# Patient Record
Sex: FEMALE | Race: WHITE | URBAN - METROPOLITAN AREA
[De-identification: names, ages, dates, MRNs, and addresses within clinical notes are randomized per-mention and may not be internally consistent; named-entity substitution may affect disease eponyms.]

---

## 2022-08-30 ENCOUNTER — WEB ENCOUNTER (OUTPATIENT)
Dept: URBAN - METROPOLITAN AREA CLINIC 40 | Facility: CLINIC | Age: 67
End: 2022-08-30

## 2022-08-30 ENCOUNTER — OFFICE VISIT (OUTPATIENT)
Dept: URBAN - METROPOLITAN AREA CLINIC 40 | Facility: CLINIC | Age: 67
End: 2022-08-30
Payer: MEDICARE

## 2022-08-30 VITALS
HEIGHT: 61 IN | WEIGHT: 176 LBS | SYSTOLIC BLOOD PRESSURE: 113 MMHG | DIASTOLIC BLOOD PRESSURE: 80 MMHG | OXYGEN SATURATION: 97 % | HEART RATE: 62 BPM | BODY MASS INDEX: 33.23 KG/M2 | TEMPERATURE: 97.2 F

## 2022-08-30 DIAGNOSIS — R10.32 LEFT LOWER QUADRANT ABDOMINAL PAIN: ICD-10-CM

## 2022-08-30 DIAGNOSIS — K21.9 CHRONIC GERD: ICD-10-CM

## 2022-08-30 DIAGNOSIS — R14.0 BLOATING: ICD-10-CM

## 2022-08-30 DIAGNOSIS — K59.00 CONSTIPATION: ICD-10-CM

## 2022-08-30 DIAGNOSIS — R11.0 NAUSEA: ICD-10-CM

## 2022-08-30 DIAGNOSIS — K62.5 RECTAL BLEEDING: ICD-10-CM

## 2022-08-30 DIAGNOSIS — R94.5 ABNORMAL LIVER FUNCTION TESTS: ICD-10-CM

## 2022-08-30 PROCEDURE — 99214 OFFICE O/P EST MOD 30 MIN: CPT | Performed by: INTERNAL MEDICINE

## 2022-08-30 RX ORDER — CARVEDILOL 3.12 MG/1
1 TABLET WITH FOOD TABLET, FILM COATED ORAL TWICE A DAY
Status: ACTIVE | COMMUNITY

## 2022-08-30 NOTE — HPI-TODAY'S VISIT:
Patient was last seen in our office on 5/15/2019.  She underwent colonoscopy on 1/29/2018 which showed sigmoid diverticulosis.  She has a history of gastroesophageal reflux.  She underwent a normal right upper quadrant ultrasound December 2017. Patient returns for evaluation on 8/30/2022.  Overall she been doing well, with essentially normal bowel movements until several weeks ago.  She then presented with severe constipation, which did resolve after taking mineral oil.  She then added nightly magnesium, which while initially seem to help, then caused an episode of severe diarrhea accompanied by incontinence.  She denies any overt bleeding, she has been complaining of some bloating after meals.  She has been using digestive enzymes, which she states has been beneficial.  She has had mild reflux, but has not been on PPI therapy, and has had no swallowing issues.  She denies any significant abdominal pain.

## 2022-10-13 ENCOUNTER — OFFICE VISIT (OUTPATIENT)
Dept: URBAN - METROPOLITAN AREA CLINIC 40 | Facility: CLINIC | Age: 67
End: 2022-10-13

## 2023-11-14 ENCOUNTER — TELEPHONE ENCOUNTER (OUTPATIENT)
Dept: URBAN - METROPOLITAN AREA CLINIC 40 | Facility: CLINIC | Age: 68
End: 2023-11-14

## 2023-11-28 ENCOUNTER — OFFICE VISIT (OUTPATIENT)
Dept: URBAN - METROPOLITAN AREA CLINIC 74 | Facility: CLINIC | Age: 68
End: 2023-11-28
Payer: MEDICARE

## 2023-11-28 ENCOUNTER — DASHBOARD ENCOUNTERS (OUTPATIENT)
Age: 68
End: 2023-11-28

## 2023-11-28 VITALS
OXYGEN SATURATION: 97 % | HEIGHT: 61 IN | TEMPERATURE: 98.1 F | DIASTOLIC BLOOD PRESSURE: 72 MMHG | SYSTOLIC BLOOD PRESSURE: 110 MMHG | WEIGHT: 175 LBS | HEART RATE: 86 BPM | BODY MASS INDEX: 33.04 KG/M2

## 2023-11-28 DIAGNOSIS — K21.9 CHRONIC GERD: ICD-10-CM

## 2023-11-28 DIAGNOSIS — K59.09 CHANGE IN BOWEL MOVEMENTS INTERMITTENT CONSTIPATION. URGENCY IN THE MORNING.: ICD-10-CM

## 2023-11-28 DIAGNOSIS — R14.0 BLOATING: ICD-10-CM

## 2023-11-28 PROCEDURE — 99202 OFFICE O/P NEW SF 15 MIN: CPT | Performed by: NURSE PRACTITIONER

## 2023-11-28 RX ORDER — CARVEDILOL 3.12 MG/1
1 TABLET WITH FOOD TABLET, FILM COATED ORAL TWICE A DAY
Status: ACTIVE | COMMUNITY

## 2023-11-28 NOTE — HPI-TODAY'S VISIT:
68-year-old female patient with past medical history as listed below, known to Dr. Zaman.  Last office visit August 30, 2022 for IBS, advised high-fiber diet, Metamucil caplets, MiraLAX as needed. She was to follow-up in 1 month.  --Colonoscopy January 29, 2018 sigmoid diverticulosis.  History of GERD.  Right upper quadrant ultrasound December 2017.  Had been using mineral oil, magnesium.  Mild reflux not on a PPI.   Patient called in November 14, 2023 with complaints of constipation been taking over-the-counter laxatives,magnesium pre and probiotics.  Going a week without a BM, incomplete evacuation.  Advised to take MiraLAX twice daily and plenty of water. She was to repeat her colonoscopy in 10 years.  -- The patient presents today with c/o constipation. She states she had a bad week 11/3, was at an event and had alot of  English food, overdid it with sheperds pie, constipated so bad hurt to walk. She took senna, mineral oil, didn't take away the discomfort but helped with stools. She was taking a  pre biotic and pro biotic 2 weeks prior to event and feels that contributed to constipation as well. She has battled constipation for a while and wants to have regular BMs. She sees a chiropractor who does kinesiology and advised natural diet products. She started taking bile salts, feels that helps. She had a BM yesterday, but still feels like incomplete evacuation. She is going to start taking a plant based protein shake. She is  drinking more water, less soda. Has c/o bloating. Denies any pain at present. She felt the MiraLAX helped her, but she has not taken any in 4 days. Advised she can take it every day if needed. Gave samples of Linzess 290 mcg to try. Will consider prescription if effective. LOWFODMAP given and discussed for bloating.

## 2023-11-30 ENCOUNTER — WEB ENCOUNTER (OUTPATIENT)
Dept: URBAN - METROPOLITAN AREA CLINIC 74 | Facility: CLINIC | Age: 68
End: 2023-11-30

## 2023-11-30 RX ORDER — LINACLOTIDE 290 UG/1
1 CAPSULE AT LEAST 30 MINUTES BEFORE THE FIRST MEAL OF THE DAY ON AN EMPTY STOMACH CAPSULE, GELATIN COATED ORAL ONCE A DAY
Qty: 30 | Refills: 3 | OUTPATIENT
Start: 2023-12-03 | End: 2024-04-01

## 2023-12-01 ENCOUNTER — TELEPHONE ENCOUNTER (OUTPATIENT)
Dept: URBAN - METROPOLITAN AREA CLINIC 74 | Facility: CLINIC | Age: 68
End: 2023-12-01

## 2023-12-03 PROBLEM — 440630006: Status: ACTIVE | Noted: 2023-12-03

## 2023-12-04 ENCOUNTER — TELEPHONE ENCOUNTER (OUTPATIENT)
Dept: URBAN - METROPOLITAN AREA CLINIC 40 | Facility: CLINIC | Age: 68
End: 2023-12-04

## 2023-12-06 ENCOUNTER — WEB ENCOUNTER (OUTPATIENT)
Dept: URBAN - METROPOLITAN AREA CLINIC 74 | Facility: CLINIC | Age: 68
End: 2023-12-06

## 2023-12-06 ENCOUNTER — OFFICE VISIT (OUTPATIENT)
Dept: URBAN - METROPOLITAN AREA CLINIC 40 | Facility: CLINIC | Age: 68
End: 2023-12-06

## 2024-10-03 ENCOUNTER — OFFICE VISIT (OUTPATIENT)
Dept: URBAN - METROPOLITAN AREA CLINIC 40 | Facility: CLINIC | Age: 69
End: 2024-10-03
Payer: MEDICARE

## 2024-10-03 VITALS
BODY MASS INDEX: 32.06 KG/M2 | HEIGHT: 61 IN | HEART RATE: 87 BPM | WEIGHT: 169.8 LBS | TEMPERATURE: 98.7 F | DIASTOLIC BLOOD PRESSURE: 74 MMHG | SYSTOLIC BLOOD PRESSURE: 126 MMHG

## 2024-10-03 DIAGNOSIS — K59.00 CONSTIPATION: ICD-10-CM

## 2024-10-03 DIAGNOSIS — R14.0 BLOATING: ICD-10-CM

## 2024-10-03 DIAGNOSIS — K21.9 CHRONIC GERD: ICD-10-CM

## 2024-10-03 PROCEDURE — 99214 OFFICE O/P EST MOD 30 MIN: CPT | Performed by: INTERNAL MEDICINE

## 2024-10-03 RX ORDER — LINACLOTIDE 145 UG/1
1 CAPSULE AT LEAST 30 MINUTES BEFORE THE FIRST MEAL OF THE DAY ON AN EMPTY STOMACH CAPSULE, GELATIN COATED ORAL ONCE A DAY
Qty: 90 | Refills: 3 | OUTPATIENT
Start: 2024-10-03 | End: 2025-09-28

## 2024-10-03 RX ORDER — ELETRIPTAN HYDROBROMIDE 40 MG/1
1 TABLET TABLET, FILM COATED ORAL ONCE A DAY
Status: ACTIVE | COMMUNITY

## 2024-10-03 RX ORDER — FLUTICASONE FUROATE, UMECLIDINIUM BROMIDE AND VILANTEROL TRIFENATATE 200; 62.5; 25 UG/1; UG/1; UG/1
1 PUFF POWDER RESPIRATORY (INHALATION) ONCE A DAY
Status: ACTIVE | COMMUNITY

## 2024-10-03 RX ORDER — CARVEDILOL 3.12 MG/1
1 TABLET WITH FOOD TABLET, FILM COATED ORAL TWICE A DAY
Status: ACTIVE | COMMUNITY

## 2024-10-03 NOTE — HPI-TODAY'S VISIT:
68-year-old female patient with past medical history as listed below, known to Dr. Zaman.  Last office visit August 30, 2022 for IBS, advised high-fiber diet, Metamucil caplets, MiraLAX as needed. She was to follow-up in 1 month.  --Colonoscopy January 29, 2018 sigmoid diverticulosis.  History of GERD.  Right upper quadrant ultrasound December 2017.  Had been using mineral oil, magnesium.  Mild reflux not on a PPI.   Patient called in November 14, 2023 with complaints of constipation been taking over-the-counter laxatives,magnesium pre and probiotics.  Going a week without a BM, incomplete evacuation.  Advised to take MiraLAX twice daily and plenty of water. She was to repeat her colonoscopy in 10 years.  -- The patient presents today with c/o constipation. She states she had a bad week 11/3, was at an event and had alot of  English food, overdid it with sheperds pie, constipated so bad hurt to walk. She took senna, mineral oil, didn't take away the discomfort but helped with stools. She was taking a  pre biotic and pro biotic 2 weeks prior to event and feels that contributed to constipation as well. She has battled constipation for a while and wants to have regular BMs. She sees a chiropractor who does kinesiology and advised natural diet products. She started taking bile salts, feels that helps. She had a BM yesterday, but still feels like incomplete evacuation. She is going to start taking a plant based protein shake. She is  drinking more water, less soda. Has c/o bloating. Denies any pain at present. She felt the MiraLAX helped her, but she has not taken any in 4 days. Advised she can take it every day if needed. Gave samples of Linzess 290 mcg to try. Will consider prescription if effective. LOWFODMAP given and discussed for bloating. Patient was last seen in our office on 5/15/2019.  She underwent colonoscopy on 1/29/2018 which showed sigmoid diverticulosis.  She has a history of gastroesophageal reflux.  She underwent a normal right upper quadrant ultrasound December 2017. Patient returns for evaluation on 8/30/2022.  Overall she been doing well, with essentially normal bowel movements until several weeks ago.  She then presented with severe constipation, which did resolve after taking mineral oil.  She then added nightly magnesium, which while initially seem to help, then caused an episode of severe diarrhea accompanied by incontinence.  She denies any overt bleeding, she has been complaining of some bloating after meals.  She has been using digestive enzymes, which she states has been beneficial.  She has had mild reflux, but has not been on PPI therapy, and has had no swallowing issues.  She denies any significant abdominal pain. Patient returns for follow-up on 10/3/2024.  She states that when she is constipated, she will experience lower abdominal discomfort and bloating after a bowel movement she does feel better in the past Linzess 290 mcg has been effective, but may result in diarrhea.  She may benefit from Linzess 145.  She does take MiraLAX but not on a regular basis.  Her last colonoscopy was performed in 2018 with sigmoid diverticulosis was found she does not have a family history of colon cancer or a past history of colon polyps she is now due for follow-up colonoscopy for screening in 4 years.  At present she is having no significant heartburn or reflux and no swallowing issues.  There is been no overt GI bleeding and no unusual weight loss.

## 2025-04-03 ENCOUNTER — OFFICE VISIT (OUTPATIENT)
Dept: URBAN - METROPOLITAN AREA CLINIC 40 | Facility: CLINIC | Age: 70
End: 2025-04-03
Payer: COMMERCIAL

## 2025-04-03 DIAGNOSIS — K21.9 CHRONIC GERD: ICD-10-CM

## 2025-04-03 DIAGNOSIS — R14.0 BLOATING: ICD-10-CM

## 2025-04-03 DIAGNOSIS — K59.09 CONSTIPATION: ICD-10-CM

## 2025-04-03 PROCEDURE — 99213 OFFICE O/P EST LOW 20 MIN: CPT | Performed by: INTERNAL MEDICINE

## 2025-04-03 RX ORDER — ELETRIPTAN HYDROBROMIDE 40 MG/1
1 TABLET TABLET, FILM COATED ORAL ONCE A DAY
Status: ACTIVE | COMMUNITY

## 2025-04-03 RX ORDER — LINACLOTIDE 145 UG/1
1 CAPSULE AT LEAST 30 MINUTES BEFORE THE FIRST MEAL OF THE DAY ON AN EMPTY STOMACH CAPSULE, GELATIN COATED ORAL ONCE A DAY
Qty: 90 | Refills: 3 | Status: ACTIVE | COMMUNITY
Start: 2024-10-03 | End: 2025-09-28

## 2025-04-03 RX ORDER — CARVEDILOL 3.12 MG/1
1 TABLET WITH FOOD TABLET, FILM COATED ORAL TWICE A DAY
Status: ACTIVE | COMMUNITY

## 2025-04-03 RX ORDER — LINACLOTIDE 145 UG/1
1 CAPSULE AT LEAST 30 MINUTES BEFORE THE FIRST MEAL OF THE DAY ON AN EMPTY STOMACH CAPSULE, GELATIN COATED ORAL ONCE A DAY
Qty: 90 | Refills: 3 | OUTPATIENT
Start: 2025-03-29 | End: 2026-03-24

## 2025-04-03 RX ORDER — FLUTICASONE FUROATE, UMECLIDINIUM BROMIDE AND VILANTEROL TRIFENATATE 200; 62.5; 25 UG/1; UG/1; UG/1
1 PUFF POWDER RESPIRATORY (INHALATION) ONCE A DAY
Status: ACTIVE | COMMUNITY

## 2025-04-03 NOTE — HPI-TODAY'S VISIT:
68-year-old female patient with past medical history as listed below, known to Dr. Zaman.  Last office visit August 30, 2022 for IBS, advised high-fiber diet, Metamucil caplets, MiraLAX as needed. She was to follow-up in 1 month.  --Colonoscopy January 29, 2018 sigmoid diverticulosis.  History of GERD.  Right upper quadrant ultrasound December 2017.  Had been using mineral oil, magnesium.  Mild reflux not on a PPI.   Patient called in November 14, 2023 with complaints of constipation been taking over-the-counter laxatives,magnesium pre and probiotics.  Going a week without a BM, incomplete evacuation.  Advised to take MiraLAX twice daily and plenty of water. She was to repeat her colonoscopy in 10 years.  -- The patient presents today with c/o constipation. She states she had a bad week 11/3, was at an event and had alot of  English food, overdid it with sheperds pie, constipated so bad hurt to walk. She took senna, mineral oil, didn't take away the discomfort but helped with stools. She was taking a  pre biotic and pro biotic 2 weeks prior to event and feels that contributed to constipation as well. She has battled constipation for a while and wants to have regular BMs. She sees a chiropractor who does kinesiology and advised natural diet products. She started taking bile salts, feels that helps. She had a BM yesterday, but still feels like incomplete evacuation. She is going to start taking a plant based protein shake. She is  drinking more water, less soda. Has c/o bloating. Denies any pain at present. She felt the MiraLAX helped her, but she has not taken any in 4 days. Advised she can take it every day if needed. Gave samples of Linzess 290 mcg to try. Will consider prescription if effective. LOWFODMAP given and discussed for bloating. Patient was last seen in our office on 5/15/2019.  She underwent colonoscopy on 1/29/2018 which showed sigmoid diverticulosis.  She has a history of gastroesophageal reflux.  She underwent a normal right upper quadrant ultrasound December 2017. Patient returns for evaluation on 8/30/2022.  Overall she been doing well, with essentially normal bowel movements until several weeks ago.  She then presented with severe constipation, which did resolve after taking mineral oil.  She then added nightly magnesium, which while initially seem to help, then caused an episode of severe diarrhea accompanied by incontinence.  She denies any overt bleeding, she has been complaining of some bloating after meals.  She has been using digestive enzymes, which she states has been beneficial.  She has had mild reflux, but has not been on PPI therapy, and has had no swallowing issues.  She denies any significant abdominal pain. Patient returns for follow-up on 10/3/2024.  She states that when she is constipated, she will experience lower abdominal discomfort and bloating after a bowel movement she does feel better in the past Linzess 290 mcg has been effective, but may result in diarrhea.  She may benefit from Linzess 145.  She does take MiraLAX but not on a regular basis.  Her last colonoscopy was performed in 2018 with sigmoid diverticulosis was found she does not have a family history of colon cancer or a past history of colon polyps she is now due for follow-up colonoscopy for screening in 4 years.  At present she is having no significant heartburn or reflux and no swallowing issues.  There is been no overt GI bleeding and no unusual weight loss. Patient returns for follow-up on 4/3/2025.  She did undergo her last colonoscopy in 2018 which was a screening exam and was normal except for scattered diverticulosis.  She is feeling well from a GI standpoint.  She is having no significant heartburn or reflux, and no swallowing issues she denies any consistent abdominal pain her constipation responds well to as needed Linzess 145 mcg..  She eats well with a good appetite, and overall is feeling well.

## 2025-06-26 ENCOUNTER — OFFICE VISIT (OUTPATIENT)
Dept: URBAN - METROPOLITAN AREA CLINIC 40 | Facility: CLINIC | Age: 70
End: 2025-06-26

## 2025-06-26 RX ORDER — CARVEDILOL 3.12 MG/1
1 TABLET WITH FOOD TABLET, FILM COATED ORAL TWICE A DAY
Status: ACTIVE | COMMUNITY

## 2025-06-26 RX ORDER — LINACLOTIDE 145 UG/1
1 CAPSULE AT LEAST 30 MINUTES BEFORE THE FIRST MEAL OF THE DAY ON AN EMPTY STOMACH CAPSULE, GELATIN COATED ORAL ONCE A DAY
Qty: 90 | Refills: 3 | OUTPATIENT
Start: 2025-06-23 | End: 2026-06-18

## 2025-06-26 RX ORDER — ELETRIPTAN HYDROBROMIDE 40 MG/1
1 TABLET TABLET, FILM COATED ORAL ONCE A DAY
Status: ACTIVE | COMMUNITY

## 2025-06-26 RX ORDER — LINACLOTIDE 145 UG/1
1 CAPSULE AT LEAST 30 MINUTES BEFORE THE FIRST MEAL OF THE DAY ON AN EMPTY STOMACH CAPSULE, GELATIN COATED ORAL ONCE A DAY
Qty: 90 | Refills: 3 | Status: ACTIVE | COMMUNITY
Start: 2025-03-29 | End: 2026-03-24

## 2025-06-26 RX ORDER — FLUTICASONE FUROATE, UMECLIDINIUM BROMIDE AND VILANTEROL TRIFENATATE 200; 62.5; 25 UG/1; UG/1; UG/1
1 PUFF POWDER RESPIRATORY (INHALATION) ONCE A DAY
Status: ACTIVE | COMMUNITY

## 2025-07-29 ENCOUNTER — OFFICE VISIT (OUTPATIENT)
Dept: URBAN - METROPOLITAN AREA CLINIC 40 | Facility: CLINIC | Age: 70
End: 2025-07-29

## 2025-07-29 RX ORDER — ELETRIPTAN HYDROBROMIDE 40 MG/1
1 TABLET TABLET, FILM COATED ORAL ONCE A DAY
Status: ACTIVE | COMMUNITY

## 2025-07-29 RX ORDER — LINACLOTIDE 145 UG/1
1 CAPSULE AT LEAST 30 MINUTES BEFORE THE FIRST MEAL OF THE DAY ON AN EMPTY STOMACH CAPSULE, GELATIN COATED ORAL ONCE A DAY
Qty: 90 | Refills: 3 | Status: ACTIVE | COMMUNITY
Start: 2025-06-23 | End: 2026-06-18

## 2025-07-29 RX ORDER — CARVEDILOL 3.12 MG/1
1 TABLET WITH FOOD TABLET, FILM COATED ORAL TWICE A DAY
Status: ACTIVE | COMMUNITY

## 2025-07-29 RX ORDER — FLUTICASONE FUROATE, UMECLIDINIUM BROMIDE AND VILANTEROL TRIFENATATE 200; 62.5; 25 UG/1; UG/1; UG/1
1 PUFF POWDER RESPIRATORY (INHALATION) ONCE A DAY
Status: ACTIVE | COMMUNITY